# Patient Record
Sex: FEMALE | Race: WHITE
[De-identification: names, ages, dates, MRNs, and addresses within clinical notes are randomized per-mention and may not be internally consistent; named-entity substitution may affect disease eponyms.]

---

## 2017-10-31 ENCOUNTER — HOSPITAL ENCOUNTER (OUTPATIENT)
Dept: HOSPITAL 80 - FIMAGING | Age: 62
End: 2017-10-31
Attending: GENERAL ACUTE CARE HOSPITAL
Payer: COMMERCIAL

## 2017-10-31 DIAGNOSIS — Z12.31: Primary | ICD-10-CM

## 2017-10-31 PROCEDURE — G0202 SCR MAMMO BI INCL CAD: HCPCS

## 2018-05-10 ENCOUNTER — HOSPITAL ENCOUNTER (INPATIENT)
Dept: HOSPITAL 80 - FED | Age: 63
LOS: 8 days | Discharge: HOME | DRG: 330 | End: 2018-05-18
Attending: SURGERY | Admitting: SURGERY
Payer: COMMERCIAL

## 2018-05-10 DIAGNOSIS — K35.2: Primary | ICD-10-CM

## 2018-05-10 DIAGNOSIS — K91.89: ICD-10-CM

## 2018-05-10 LAB — PLATELET # BLD: 500 10^3/UL (ref 150–400)

## 2018-05-10 PROCEDURE — 0DTF0ZZ RESECTION OF RIGHT LARGE INTESTINE, OPEN APPROACH: ICD-10-PCS | Performed by: SURGERY

## 2018-05-10 RX ADMIN — HYDROMORPHONE HYDROCHLORIDE PRN MG: 2 INJECTION INTRAMUSCULAR; INTRAVENOUS; SUBCUTANEOUS at 21:16

## 2018-05-10 RX ADMIN — SODIUM CHLORIDE, SODIUM LACTATE, POTASSIUM CHLORIDE, AND CALCIUM CHLORIDE SCH MLS: 600; 310; 30; 20 INJECTION, SOLUTION INTRAVENOUS at 23:37

## 2018-05-10 RX ADMIN — Medication PRN MCG: at 21:48

## 2018-05-10 RX ADMIN — Medication PRN MCG: at 21:24

## 2018-05-10 RX ADMIN — HYDROMORPHONE HYDROCHLORIDE PRN MG: 2 INJECTION INTRAMUSCULAR; INTRAVENOUS; SUBCUTANEOUS at 21:37

## 2018-05-10 RX ADMIN — HYDROMORPHONE HYDROCHLORIDE PRN MG: 2 INJECTION INTRAMUSCULAR; INTRAVENOUS; SUBCUTANEOUS at 21:48

## 2018-05-10 RX ADMIN — HYDROMORPHONE HYDROCHLORIDE PRN MG: 2 INJECTION INTRAMUSCULAR; INTRAVENOUS; SUBCUTANEOUS at 21:57

## 2018-05-10 RX ADMIN — ONDANSETRON PRN MG: 2 SOLUTION INTRAMUSCULAR; INTRAVENOUS at 23:30

## 2018-05-10 RX ADMIN — Medication PRN MCG: at 21:37

## 2018-05-10 RX ADMIN — Medication PRN MCG: at 21:03

## 2018-05-10 RX ADMIN — HYDROMORPHONE HYDROCHLORIDE PRN MG: 2 INJECTION INTRAMUSCULAR; INTRAVENOUS; SUBCUTANEOUS at 21:24

## 2018-05-10 RX ADMIN — Medication PRN MCG: at 21:57

## 2018-05-10 RX ADMIN — Medication PRN MCG: at 21:13

## 2018-05-10 NOTE — POSTOPPROG
Post Op Note


Date of Operation: 05/10/18


Surgeon: Juan Toure


Assistant: coltraine


Pre-op Diagnosis: ruptured appendix


Post-op Diagnosis: mass in cecum


Indication: rlq mass


Procedure: right colecotmy


Findings: mass, possible cancer of cecum


Inf/Abcess present in the surg proc area at time of surgery?: No


EBL: Minimal

## 2018-05-10 NOTE — GOP
[f rep st]



                                                                OPERATIVE REPORT





DATE OF OPERATION:  



SURGEON:  Juan Toure MD



ASSISTANT:  Javi Luna, VICKEYA, LSA.



ANESTHESIA:  General.



PREOPERATIVE DIAGNOSIS:  Perforated appendicitis with abscess.



POSTOPERATIVE DIAGNOSIS:  Inflammatory mass cecum.  No abscess.



PROCEDURE PERFORMED:  



FINDINGS:  





INDICATIONS:  The patient is a 62-year-old female who presents with a 3-week history of right lower q
uadrant pain.  A CT scan preoperatively had suggested possible ruptured appendicitis with an abscess.




DESCRIPTION OF PROCEDURE:  The abdomen was scrubbed with ChloraPrep and draped in the usual sterile f
ashion.  Palpation revealed a firm 10 cm mass just medial to McBurney's point.  A midline incision wa
s made through the umbilicus.  Eventually this was lengthened further cephalad. Retraction was done w
ith an Denys large wound retractor.  The abdomen was opened and explored.  Small bowel was brought up
 out of the pelvis and packed cephalad.  The omentum was pushed cephalad.  Inflammatory mass involvin
g the cecum was noted.  The cecal tip was deviated medially, but the whole mass was fairly unapproach
able and welded in the retroperitoneum on the right. With the incision extended the right colon was m
obilized at its more normal midportion and the hepatic flexure developed easily.  The colon was broug
ht medially identifying the duodenum.  There was inflammation all the way up to the duodenum.  Carefu
l dissection allowed the colon to be brought completely medially at which point, the omentum was sepa
rated from the hepatic flexure.  It was felt that this could certainly be a perforated carcinoma of t
he cecum, so it was felt reasonable to do a right colectomy.  The mesocolon was opened near the hepat
ic flexure.  The right branch of the middle colic artery amputated with clamps and ties and then the 
mesentery harvested with clamps and ties.  This was difficult because of the very thick, indurated na
ture of the root of the mesentery.  After all pedicles were tied, the ileocolic vessel was approached
, clamped, and divided.  The terminal ileum was amputated for few centimeters  and the bowel transect
ed with a stapling device.  The transverse colon was also transected with a stapling device, and the 
specimen handed off the field.  A search of the specimen suggested there might be an appendiceal stum
p without the rest of the appendix, although the tissue was so necrotic, it was difficult to tell.  A
 search in the right lower quadrant showed some inflammatory tissues which could be peeled off the re
troperitoneum.  There was quite a bit of reaction, and it was really unclear what was being removed, 
but this was submitted as a separate specimen called additional right lower quadrant tissues.  It was
 felt it might have a portion of the distal appendix.  The abdomen was irrigated with several liters 
of saline.  Hemostasis was excellent and isoperistaltic anastomosis was done from the terminal ileum 
to the right transverse colon with a stapling device.  The enterotomy was closed with a running 3-0 V
icryl followed by interrupted 3-0 silk Lembert sutures.  The mesenteric defect was closed to prevent 
internal hernias.  With the anastomosis in the right upper quadrant all lap pads were removed.  Hemos
tasis was again evaluated and was excellent.  A clean closure was practiced with all new drapes, glov
es, gowns, instruments, etc.  The linea alba was closed with a #1 PDS from either end, tied in the mi
ddle.  Then several liters of warm saline were used to irrigate the subcutaneous fat.  Hemostasis ens
ured and the skin closed with stainless steel clips.  The patient tolerated the procedure well.



SURGEON:  Juan Toure MD.





Job #:  [id number]

## 2018-05-10 NOTE — PDANEPAE
ANE History of Present Illness





Probable perforated appy





ANE Past Medical History





- Pulmonary History


Hx Oxygen in Use at Home: No


Hx Sleep Apnea: Yes


Sleep Apnea Screening Result - Last Documented: Positive





- Endocrine History


Hx Diabetes: No





ANE Review of Systems


Review of Systems: 








ANE Patient History





- Allergies


Allergies/Adverse Reactions: 








codeine Allergy (Verified 05/10/18 13:48)


 








- Home Medications


Home medications: home medication list seen and reviewed


Home Medications: 








Ciprofloxacin [Cipro] 500 mg PO BID 05/10/18 [Last Taken 05/10/18]


Timolol 0.25% [TIMOPTIC 0.25% (*)] 1 drops EACHEYE DAILY 05/10/18 [Last Taken 05

/10/18]


metroNIDAZOLE [Flagyl 500 mg (*)] 500 mg PO BID 05/10/18 [Last Taken 05/10/18]








- NPO status


NPO Since - Liquids (Date): 05/10/18


NPO Since - Liquids (Time): 11:30


NPO Since - Solids (Date): 05/10/18


NPO Since - Solids (Time): 11:30





- Anes Hx


Anes Hx: no prior problems





- Smoking Hx


Smoking Status: Never smoked





ANE Labs/Vital Signs





- Labs


Result Diagrams: 


 05/10/18 14:00





 05/10/18 14:00





- Vital Signs


Blood Pressure: 149/92


Heart Rate: 68


Respiratory Rate: 14


O2 Sat (%): 95


Height: 170.18 cm


Weight: 89.358 kg





ANE Physical Exam





- Airway


Neck exam: FROM


Mallampati Score: Class 1


Mouth exam: normal dental/mouth exam





- Pulmonary


Pulmonary: no respiratory distress





- Cardiovascular


Cardiovascular: regular rate and rhythym





- ASA Status


ASA Status: II, E





ANE Anesthesia Plan


Anesthesia Plan: general endotracheal anesthesia

## 2018-05-10 NOTE — EDPHY
H & P


Stated Complaint: sent by MD for ruptured abbendix on CT


Time Seen by Provider: 05/10/18 13:58


HPI/ROS: 





CHIEF COMPLAINT:  Appendicitis





HISTORY OF PRESENT ILLNESS:  62-year-old female presents with appendicitis, 

diagnosed on CT scan.  3 week history of right lower quadrant pain.  She was 

seen by a physician and diagnosed with appendicitis.  Initially placed on Cipro 

and later Flagyl was added to the regimen.  She has had persistent right lower 

quadrant pain.  She saw Dr. Damon at [facility] this morning and had an 

outpatient CT scan that demonstrated ruptured appendicitis.  Tolerating oral 

fluids well.  No fever today.





REVIEW OF SYSTEMS:  complete 10 point ROS negative except at noted in the HPI








- Personal History


Current Tetanus/Diphtheria Vaccine: Unsure


Current Tetanus Diphtheria and Acellular Pertussis (TDAP): Unsure





- Medical/Surgical History


Hx Asthma: No


Hx Chronic Respiratory Disease: No


Hx Diabetes: No


Hx Cardiac Disease: No


Hx Renal Disease: No


Hx Cirrhosis: No


Hx Alcoholism: No


Hx HIV/AIDS: No


Hx Splenectomy or Spleen Trauma: No


Other PMH: breast reduction, 3 





- Social History


Smoking Status: Never smoked





- Physical Exam


Exam: 





General Appearance:  Alert, pleasant


Eyes:  Pupils equal and round, no conjunctival pallor 


ENT, Mouth:  Mucous membranes moist


Neck:  Normal inspection


Respiratory:  Lungs are clear to auscultation


Cardiovascular:  Regular rate and rhythm


Gastrointestinal:  Abdomen is soft, right lower quadrant tenderness


Neurological:  A&O, nonfocal, normal gait


Skin:  Warm and dry


Extremities:  Normal inspection


Psychiatric:  Mood and affect normal





Constitutional: 


 Initial Vital Signs











Temperature (C)  36.5 C   05/10/18 13:48


 


Heart Rate  84   05/10/18 13:48


 


Respiratory Rate  16   05/10/18 13:48


 


Blood Pressure  173/93 H  05/10/18 13:48


 


O2 Sat (%)  92   05/10/18 13:48








 











O2 Delivery Mode               Room Air














Allergies/Adverse Reactions: 


 





codeine Allergy (Verified 05/10/18 13:48)


 








Home Medications: 














 Medication  Instructions  Recorded


 


Ciprofloxacin [Cipro] 500 mg PO BID 05/10/18


 


Timolol 0.25% [TIMOPTIC 0.25% (*)] 1 drops EACHEYE DAILY 05/10/18


 


metroNIDAZOLE [Flagyl 500 mg (*)] 500 mg PO BID 05/10/18














Medical Decision Making


ED Course/Re-evaluation: 





This patient presents with ruptured appendicitis, diagnosed by CT scan earlier 

today.  Unfortunately, the patient forgot to bring the CD of the CT scan and 

ate 1.5 hrs ago.  Consulted Dr. Toure who saw the patient in the emergency 

department.  Levaquin and Flagyl IV given.  Plan for appendectomy.


Differential Diagnosis: 





Differential diagnosis includes though it is not limited to appendicitis, 

cholecystitis, diverticulitis, pyelonephritis, bowel perforation, small bowel 

obstruction.





- Data Points


Laboratory Results: 


 Laboratory Results





 05/10/18 14:00 





 05/10/18 14:00 





 











  05/10/18 05/10/18





  14:00 14:00


 


WBC    7.14 10^3/uL 10^3/uL





    (3.80-9.50) 


 


RBC    4.00 10^6/uL L 10^6/uL





    (4.18-5.33) 


 


Hgb    11.5 g/dL L g/dL





    (12.6-16.3) 


 


Hct    35.4 % L %





    (38.0-47.0) 


 


MCV    88.5 fL fL





    (81.5-99.8) 


 


MCH    28.8 pg pg





    (27.9-34.1) 


 


MCHC    32.5 g/dL g/dL





    (32.4-36.7) 


 


RDW    13.2 % %





    (11.5-15.2) 


 


Plt Count    500 10^3/uL H 10^3/uL





    (150-400) 


 


MPV    9.3 fL fL





    (8.7-11.7) 


 


Neut % (Auto)    60.0 % %





    (39.3-74.2) 


 


Lymph % (Auto)    27.6 % %





    (15.0-45.0) 


 


Mono % (Auto)    9.4 % %





    (4.5-13.0) 


 


Eos % (Auto)    1.5 % %





    (0.6-7.6) 


 


Baso % (Auto)    0.4 % %





    (0.3-1.7) 


 


Nucleat RBC Rel Count    0.0 % %





    (0.0-0.2) 


 


Absolute Neuts (auto)    4.28 10^3/uL 10^3/uL





    (1.70-6.50) 


 


Absolute Lymphs (auto)    1.97 10^3/uL 10^3/uL





    (1.00-3.00) 


 


Absolute Monos (auto)    0.67 10^3/uL 10^3/uL





    (0.30-0.80) 


 


Absolute Eos (auto)    0.11 10^3/uL 10^3/uL





    (0.03-0.40) 


 


Absolute Basos (auto)    0.03 10^3/uL 10^3/uL





    (0.02-0.10) 


 


Absolute Nucleated RBC    0.00 10^3/uL 10^3/uL





    (0-0.01) 


 


Immature Gran %    1.1 % %





    (0.0-1.1) 


 


Immature Gran #    0.08 10^3/uL 10^3/uL





    (0.00-0.10) 


 


Sodium  144 mEq/L mEq/L  





   (135-145)  


 


Potassium  4.2 mEq/L mEq/L  





   (3.5-5.2)  


 


Chloride  103 mEq/L mEq/L  





   ()  


 


Carbon Dioxide  27 mEq/l mEq/l  





   (22-31)  


 


Anion Gap  14 mEq/L mEq/L  





   (8-16)  


 


BUN  10 mg/dL mg/dL  





   (7-23)  


 


Creatinine  0.7 mg/dL mg/dL  





   (0.6-1.0)  


 


Estimated GFR  > 60   





   


 


Glucose  110 mg/dL H mg/dL  





   ()  


 


Calcium  9.0 mg/dL mg/dL  





   (8.5-10.4)  











Medications Given: 


 








Discontinued Medications





Bupivacaine HCl (Sensorcaine 0.5% Vial) Confirm Administered Dose 30 ml .ROUTE 

.STK-MED ONE


   Stop: 05/10/18 15:49


   Last Admin: 05/10/18 19:24 Dose:  Not Given


Fentanyl (Sublimaze)  25 - 100 mcg IVP Q5M PRN


   PRN Reason: PACU, IMMEDIATE Pain control


   Stop: 05/10/18 20:13


   Last Admin: 05/10/18 21:03 Dose:  50 mcg


Levofloxacin/Dextrose (Levaquin 750 Mg (Premix))  150 mls @ 100 mls/hr IV EDNOW 

ONE


   PRN Reason: Protocol


   Stop: 05/10/18 17:15


   Last Admin: 05/10/18 16:07 Dose:  150 mls


Metronidazole/Sodium Chloride (Flagyl 500 Mg (Premix))  100 mls @ 100 mls/hr IV 

EDNOW ONE


   PRN Reason: Protocol


   Stop: 05/10/18 16:45


   Last Admin: 05/10/18 17:48 Dose:  100 mls


Midazolam HCl (Versed)  2 mg IVP ONCALL ONE


   Stop: 05/10/18 18:28


   Last Admin: 05/10/18 18:39 Dose:  2 mg








Departure





- Departure


Disposition: [facility]


Clinical Impression: 


Acute appendicitis


Qualifiers:


 Acute appendicitis type: with generalized peritonitis Qualified Code(s): K35.2 

- Acute appendicitis with generalized peritonitis





Condition: Fair

## 2018-05-10 NOTE — PDGENHP
History and Physical





- Chief Complaint


RLQ PAIN. 3 WEEKS OF RLQ PAIN, PREVIOUSLY VOMITING, tx'd witha week of orax





- History of Present Illness








History Information





- Allergies/Home Medication List


Allergies/Adverse Reactions: 








codeine Allergy (Verified 05/10/18 13:48)


 





Home Medications: 








Ciprofloxacin [Cipro] 500 mg PO BID 05/10/18 [Last Taken 05/10/18]


Timolol 0.25% [TIMOPTIC 0.25% (*)] 1 drops EACHEYE DAILY 05/10/18 [Last Taken 05

/10/18]


metroNIDAZOLE [Flagyl 500 mg (*)] 500 mg PO BID 05/10/18 [Last Taken 05/10/18]





I have personally reviewed and updated: family history





- Social History


Smoking Status: Never smoked





Review of Systems


Review of Systems: 





ROS: 10pt was reviewed & negative except for what was stated in HPI & below





Physical Exam


Physical Exam: 

















Temp Pulse Resp BP Pulse Ox


 


 36.4 C   70   18   140/82 H  94 


 


 05/10/18 15:00  05/10/18 15:00  05/10/18 15:00  05/10/18 15:00  05/10/18 15:00











Constitutional: no apparent distress


Eyes: PERRL


Cardiovascular: regular rate and rhythym


Respiratory: clear to auscultation


Gastrointestinal: other (tender rlq to midline, otherwis e soft)


Genitourinary: no bladder fullness


Skin: normal color





Lab Data & Imaging Review





 05/10/18 14:00





 05/10/18 14:00














WBC  7.14 10^3/uL (3.80-9.50)   05/10/18  14:00    


 


RBC  4.00 10^6/uL (4.18-5.33)  L  05/10/18  14:00    


 


Hgb  11.5 g/dL (12.6-16.3)  L  05/10/18  14:00    


 


Hct  35.4 % (38.0-47.0)  L  05/10/18  14:00    


 


MCV  88.5 fL (81.5-99.8)   05/10/18  14:00    


 


MCH  28.8 pg (27.9-34.1)   05/10/18  14:00    


 


MCHC  32.5 g/dL (32.4-36.7)   05/10/18  14:00    


 


RDW  13.2 % (11.5-15.2)   05/10/18  14:00    


 


Plt Count  500 10^3/uL (150-400)  H  05/10/18  14:00    


 


MPV  9.3 fL (8.7-11.7)   05/10/18  14:00    


 


Neut % (Auto)  60.0 % (39.3-74.2)   05/10/18  14:00    


 


Lymph % (Auto)  27.6 % (15.0-45.0)   05/10/18  14:00    


 


Mono % (Auto)  9.4 % (4.5-13.0)   05/10/18  14:00    


 


Eos % (Auto)  1.5 % (0.6-7.6)   05/10/18  14:00    


 


Baso % (Auto)  0.4 % (0.3-1.7)   05/10/18  14:00    


 


Nucleat RBC Rel Count  0.0 % (0.0-0.2)   05/10/18  14:00    


 


Absolute Neuts (auto)  4.28 10^3/uL (1.70-6.50)   05/10/18  14:00    


 


Absolute Lymphs (auto)  1.97 10^3/uL (1.00-3.00)   05/10/18  14:00    


 


Absolute Monos (auto)  0.67 10^3/uL (0.30-0.80)   05/10/18  14:00    


 


Absolute Eos (auto)  0.11 10^3/uL (0.03-0.40)   05/10/18  14:00    


 


Absolute Basos (auto)  0.03 10^3/uL (0.02-0.10)   05/10/18  14:00    


 


Absolute Nucleated RBC  0.00 10^3/uL (0-0.01)   05/10/18  14:00    


 


Immature Gran %  1.1 % (0.0-1.1)   05/10/18  14:00    


 


Immature Gran #  0.08 10^3/uL (0.00-0.10)   05/10/18  14:00    


 


Sodium  144 mEq/L (135-145)   05/10/18  14:00    


 


Potassium  4.2 mEq/L (3.5-5.2)   05/10/18  14:00    


 


Chloride  103 mEq/L ()   05/10/18  14:00    


 


Carbon Dioxide  27 mEq/l (22-31)   05/10/18  14:00    


 


Anion Gap  14 mEq/L (8-16)   05/10/18  14:00    


 


BUN  10 mg/dL (7-23)   05/10/18  14:00    


 


Creatinine  0.7 mg/dL (0.6-1.0)   05/10/18  14:00    


 


Estimated GFR  > 60   05/10/18  14:00    


 


Glucose  110 mg/dL ()  H  05/10/18  14:00    


 


Calcium  9.0 mg/dL (8.5-10.4)   05/10/18  14:00    











Assessment & Plan


Assessment: 








Acute appendicitis (Acute)





prob ruptured appendix





plan ex lap.


for abscess and appendicits.


risks of infection, bleeding, etc

## 2018-05-11 RX ADMIN — HEPARIN SODIUM SCH UNIT: 5000 INJECTION, SOLUTION INTRAVENOUS; SUBCUTANEOUS at 15:04

## 2018-05-11 RX ADMIN — Medication SCH: at 08:23

## 2018-05-11 RX ADMIN — Medication SCH MLS: at 20:24

## 2018-05-11 RX ADMIN — SODIUM CHLORIDE, SODIUM LACTATE, POTASSIUM CHLORIDE, AND CALCIUM CHLORIDE SCH MLS: 600; 310; 30; 20 INJECTION, SOLUTION INTRAVENOUS at 17:31

## 2018-05-11 RX ADMIN — HEPARIN SODIUM SCH UNIT: 5000 INJECTION, SOLUTION INTRAVENOUS; SUBCUTANEOUS at 20:51

## 2018-05-11 RX ADMIN — SODIUM CHLORIDE, SODIUM LACTATE, POTASSIUM CHLORIDE, AND CALCIUM CHLORIDE SCH MLS: 600; 310; 30; 20 INJECTION, SOLUTION INTRAVENOUS at 08:53

## 2018-05-11 RX ADMIN — HEPARIN SODIUM SCH UNIT: 5000 INJECTION, SOLUTION INTRAVENOUS; SUBCUTANEOUS at 05:50

## 2018-05-11 RX ADMIN — TIMOLOL MALEATE SCH DROP: 5 SOLUTION OPHTHALMIC at 12:14

## 2018-05-11 RX ADMIN — Medication SCH MLS: at 12:56

## 2018-05-11 RX ADMIN — ACETAMINOPHEN PRN MG: 325 TABLET ORAL at 17:12

## 2018-05-11 NOTE — SOAPPROG
SOAP Progress Note


Assessment/Plan: 


Assessment:


























Plan:





Subjective: 





PAIN BETTER WITH EPIDURAL





vss, af


s/p right colectomy for mass- ddx is cancer versus inflammatory process.





cont clear liq until passing flatus or stool


Objective: 





 Vital Signs











Temp Pulse Resp BP Pulse Ox


 


 36.6 C   61   18   127/75 H  97 


 


 05/11/18 02:53  05/11/18 04:11  05/11/18 02:53  05/11/18 04:11  05/11/18 04:11








 











 05/10/18 05/11/18 05/12/18





 05:59 05:59 05:59


 


Intake Total  1620 


 


Output Total  145 


 


Balance  1475 














ICD10 Worksheet


Patient Problems: 


 Problems











Problem Status Onset


 


Acute appendicitis Acute

## 2018-05-11 NOTE — POSTANESTH
Post Anesthetic Evaluation


Cardiovascular Status: Normal, Stable, Similar to Pre-Op Cond


Respiratory Status: Normal, Stable, Similar to Pre-op Cond.


Level of Consciousness/Mental Status: Can Participate in Eval, Alert and 

Oriented


Pain Control: Adequate, Prn Tx Ordered


Nausea/Vomiting Control: Adequate, Prn Tx Ordered


Complications Possibly Related to Anesthesia: None Noted


Notes: 


Pt seen and examined, POD1 s/p ex lap w colectomy, postop thoracic epidural 

placed, T7-8, LEONA 6cm catheter 11 cm.  Back site c/d/i, no e/e/e.  Pain control 

much improved.  Dermatome level ~T5-L1 B symmetric.  Notes some itchiness but 

rates it as tolerable.  No n/v.


Plan:


:Continue PCEA rate 6 cc/hr, bolus 3cc q 15min.


:May ambulate with assistance.


:Plan to maintain epidural for next days, will coordinate with surgical team as 

far as disposition plan.  Will request to please hold heparin dose 6 hours 

prior to planned epidural d/c.

## 2018-05-12 RX ADMIN — HEPARIN SODIUM SCH UNIT: 5000 INJECTION, SOLUTION INTRAVENOUS; SUBCUTANEOUS at 20:15

## 2018-05-12 RX ADMIN — HEPARIN SODIUM SCH UNIT: 5000 INJECTION, SOLUTION INTRAVENOUS; SUBCUTANEOUS at 05:38

## 2018-05-12 RX ADMIN — Medication SCH MLS: at 16:54

## 2018-05-12 RX ADMIN — ACETAMINOPHEN PRN MG: 325 TABLET ORAL at 20:16

## 2018-05-12 RX ADMIN — Medication SCH EA: at 08:05

## 2018-05-12 RX ADMIN — PROMETHAZINE HYDROCHLORIDE PRN MG: 25 INJECTION INTRAMUSCULAR; INTRAVENOUS at 02:06

## 2018-05-12 RX ADMIN — SODIUM CHLORIDE, SODIUM LACTATE, POTASSIUM CHLORIDE, AND CALCIUM CHLORIDE SCH MLS: 600; 310; 30; 20 INJECTION, SOLUTION INTRAVENOUS at 05:38

## 2018-05-12 RX ADMIN — SODIUM CHLORIDE, SODIUM LACTATE, POTASSIUM CHLORIDE, AND CALCIUM CHLORIDE SCH MLS: 600; 310; 30; 20 INJECTION, SOLUTION INTRAVENOUS at 16:58

## 2018-05-12 RX ADMIN — Medication SCH MLS: at 08:05

## 2018-05-12 RX ADMIN — TIMOLOL MALEATE SCH DROP: 5 SOLUTION OPHTHALMIC at 08:02

## 2018-05-12 RX ADMIN — ACETAMINOPHEN PRN MG: 325 TABLET ORAL at 08:00

## 2018-05-12 RX ADMIN — HEPARIN SODIUM SCH UNIT: 5000 INJECTION, SOLUTION INTRAVENOUS; SUBCUTANEOUS at 15:32

## 2018-05-12 NOTE — SOAPPROG
SOAP Progress Note


Assessment/Plan: 


Assessment:


























Plan:





05/12/18 09:35


VSS, BUT TEMP 39





LUNGS CLEAR


ABD SOFT 


LEGS WITHOUT PAIN OR SELLLING.





PAZ IN








LOOKS BETTER THAN FEVER WOULD SUGGEST=- WILL CHECK CXR AND URINE CULTURE


ON ANCEF AND FLAGYL





MAY BE ALL FROM ATELECTASIS





DOING VERY WELL OTHEREWISE.


Objective: 





 Vital Signs











Temp Pulse Resp BP Pulse Ox


 


 39.3 C H  90   17   148/81 H  94 


 


 05/12/18 07:53  05/12/18 07:53  05/12/18 07:53  05/12/18 07:53  05/12/18 07:53








 











 05/11/18 05/12/18 05/13/18





 05:59 05:59 05:59


 


Intake Total 1620 3238 


 


Output Total 145 1850 


 


Balance 1475 1388 














ICD10 Worksheet


Patient Problems: 


 Problems











Problem Status Onset


 


Acute appendicitis Acute

## 2018-05-12 NOTE — ASMTCMCOM
CM Note

 

CM Note                       

Notes:

Pt had rt colectomy due to mass, which is inflammatory vs malignant. Results pending. DC needs 

unclear but too early to tell. Pt may be ready to DC Mon or Tue. CM to follow.

 

Date Signed:  05/12/2018 05:40 PM

Electronically Signed By:Devi Peña LCSW

## 2018-05-12 NOTE — POSTANESTH
Post Anesthetic Evaluation


Cardiovascular Status: Normal, Stable, Similar to Pre-Op Cond, Tx Over/Under 

Hydration


Respiratory Status: Normal, Stable


Level of Consciousness/Mental Status: Can Participate in Eval, Alert and 

Oriented


Pain Control: Inadeq, Add Tx Required


Nausea/Vomiting Control: Adequate, Prn Tx Ordered


Complications Possibly Related to Anesthesia: None Noted


Notes: 


S/P ex lap w/ colectomy, POD2, post-op epidural placed.  Pt has noted an 

interval increase in pain.  Has been pressing her bolus button often with some 

increased relief.  Back site c/d/i, no e/e/e, catheter has migrated out to ~9.5 

cm at the skin from 11 cm yesterday.  Dermatome is T4-L2 on the R and T5-T10 on 

the L.  Reports one episode of nausea, mild itchiness (tolerable).





Plan:


:Epidural catheter still in the epidural space AEB +dermatomes, though the 

outward migration of the catheter (likely from incidental pt movement) explains 

the newly one-sided quality.  Will not manipulate the catheter at this time, 

but rather increase rate to 8cc/hr Bupi 0.1% fent 2 mcg/mL with bolus 4cc q 15 

min; attempt to more effectively fill the space and cover the right side better.


:Continue multi- modal pain control with tylenol, toradol, consider neurontin 

for any neuropathic pain.


:will consider to wean from epidural and transition to PO pain meds tomorrow or 

Monday.


:will request to hold heparin dose at that time, then resume 2 hours later.

## 2018-05-13 RX ADMIN — ONDANSETRON PRN MG: 2 SOLUTION INTRAMUSCULAR; INTRAVENOUS at 04:23

## 2018-05-13 RX ADMIN — ONDANSETRON PRN MG: 2 SOLUTION INTRAMUSCULAR; INTRAVENOUS at 10:54

## 2018-05-13 RX ADMIN — TIMOLOL MALEATE SCH DROP: 5 SOLUTION OPHTHALMIC at 09:46

## 2018-05-13 RX ADMIN — HEPARIN SODIUM SCH UNIT: 5000 INJECTION, SOLUTION INTRAVENOUS; SUBCUTANEOUS at 22:14

## 2018-05-13 RX ADMIN — Medication SCH MLS: at 01:10

## 2018-05-13 RX ADMIN — HEPARIN SODIUM SCH UNIT: 5000 INJECTION, SOLUTION INTRAVENOUS; SUBCUTANEOUS at 15:18

## 2018-05-13 RX ADMIN — SODIUM CHLORIDE, SODIUM LACTATE, POTASSIUM CHLORIDE, AND CALCIUM CHLORIDE SCH MLS: 600; 310; 30; 20 INJECTION, SOLUTION INTRAVENOUS at 01:14

## 2018-05-13 RX ADMIN — SODIUM CHLORIDE, SODIUM LACTATE, POTASSIUM CHLORIDE, AND CALCIUM CHLORIDE SCH MLS: 600; 310; 30; 20 INJECTION, SOLUTION INTRAVENOUS at 15:28

## 2018-05-13 RX ADMIN — Medication SCH EA: at 09:48

## 2018-05-13 RX ADMIN — HEPARIN SODIUM SCH UNIT: 5000 INJECTION, SOLUTION INTRAVENOUS; SUBCUTANEOUS at 05:33

## 2018-05-13 RX ADMIN — PROMETHAZINE HYDROCHLORIDE PRN MG: 25 INJECTION INTRAMUSCULAR; INTRAVENOUS at 02:29

## 2018-05-13 RX ADMIN — Medication SCH MLS: at 13:05

## 2018-05-13 RX ADMIN — PROMETHAZINE HYDROCHLORIDE PRN MG: 25 INJECTION INTRAMUSCULAR; INTRAVENOUS at 07:51

## 2018-05-13 RX ADMIN — Medication SCH MLS: at 22:33

## 2018-05-13 NOTE — POSTANESTH
Post Anesthetic Evaluation


Cardiovascular Status: Normal, Stable, Similar to Pre-Op Cond


Respiratory Status: Normal, Stable, Similar to Pre-op Cond.


Level of Consciousness/Mental Status: Can Participate in Eval, Alert and 

Oriented


Pain Control: Inadeq, Add Tx Required


Nausea/Vomiting Control: Inadeq, Add Tx Reqired


Complications Possibly Related to Anesthesia: None Noted


Notes: 


POD#3 s/p ex lap/colectomy w/ PCEA.  Pt rates pain control as good, 4-5/10, 

mainly associated with movement.  Has had recent nausea and vomiting, 

temporally associated with PO liquid intake.  Back site c/d/i, no e/e/e, 

catheter stable at 9.5 cm at the skin.  Dermatone coverage unchanged from 

previous exam, R>L T4-L2


Plan:


:Unable to tolerate PO at this time so transition to PO pain meds untenable.


:Maintain PCEA bupi 0.1%/fent 2mcg/mL 8cc/hr bolus 4 cc q 15.


:May ambulate with assistance.


:Will revisit disposition of catheter tomorrow.

## 2018-05-13 NOTE — SOAPPROG
SOAP Progress Note


Assessment/Plan: 


Assessment:


























Plan:





05/12/18 09:35


VSS, BUT TEMP 39





LUNGS CLEAR


ABD SOFT 


LEGS WITHOUT PAIN OR SELLLING.





PAZ IN








LOOKS BETTER THAN FEVER WOULD SUGGEST=- WILL CHECK CXR AND URINE CULTURE


ON ANCEF AND FLAGYL





MAY BE ALL FROM ATELECTASIS





DOING VERY WELL OTHEREWISE.


Subjective: 





large emesis, no flatus.





lungs clear- cxr yesterday pretty ormal.


abd soft





access: normal wbc, but with persistant temps.


emesis troubling- will continue to monitor- may need repea ct, although 

unlikely anything acute.





slower than expected course of improvement.


Objective: 





 Vital Signs











Temp Pulse Resp BP Pulse Ox


 


 37.7 C   88   16   137/80 H  95 


 


 05/13/18 04:00  05/13/18 04:00  05/13/18 04:00  05/13/18 04:00  05/13/18 04:00








 











 05/12/18 05/13/18 05/14/18





 05:59 05:59 05:59


 


Intake Total 0058 4013 


 


Output Total 9590 1500 


 


Balance 1388 2513 














ICD10 Worksheet


Patient Problems: 


 Problems











Problem Status Onset


 


Acute appendicitis Acute

## 2018-05-13 NOTE — SOAPPROG
SOAP Progress Note


Assessment/Plan: 


Assessment:


























Plan:





05/12/18 09:35


VSS, BUT TEMP 39





LUNGS CLEAR


ABD SOFT 


LEGS WITHOUT PAIN OR SELLLING.





PAZ IN








LOOKS BETTER THAN FEVER WOULD SUGGEST=- WILL CHECK CXR AND URINE CULTURE


ON ANCEF AND FLAGYL





MAY BE ALL FROM ATELECTASIS





DOING VERY WELL OTHEREWISE.


Objective: 


ct done after large emesis- large stomach, dilated small bowel, but patent 

anastamosis- apparnet ileus. ng placed will wait for resolution prior to 

removing ng


'


willaslo start ng replacemnt orders.


 Vital Signs











Temp Pulse Resp BP Pulse Ox


 


 37.0 C   81   18   144/91 H  95 


 


 05/13/18 15:08  05/13/18 15:08  05/13/18 15:08  05/13/18 15:08  05/13/18 15:08








 Laboratory Results





 05/13/18 08:55 





 











 05/12/18 05/13/18 05/14/18





 05:59 05:59 05:59


 


Intake Total 3238 4013 


 


Output Total 1850 1500 2000


 


Balance 1388 2513 -2000














ICD10 Worksheet


Patient Problems: 


 Problems











Problem Status Onset


 


Acute appendicitis Acute

## 2018-05-14 RX ADMIN — DEXTROSE MONOHYDRATE, SODIUM CHLORIDE, AND POTASSIUM CHLORIDE SCH MLS: 50; 9; 1.49 INJECTION, SOLUTION INTRAVENOUS at 07:34

## 2018-05-14 RX ADMIN — Medication PRN MG: at 09:56

## 2018-05-14 RX ADMIN — Medication PRN MG: at 12:11

## 2018-05-14 RX ADMIN — HEPARIN SODIUM SCH UNIT: 5000 INJECTION, SOLUTION INTRAVENOUS; SUBCUTANEOUS at 21:49

## 2018-05-14 RX ADMIN — DEXTROSE MONOHYDRATE, SODIUM CHLORIDE, AND POTASSIUM CHLORIDE SCH MLS: 50; 9; 1.49 INJECTION, SOLUTION INTRAVENOUS at 17:10

## 2018-05-14 RX ADMIN — TIMOLOL MALEATE SCH DROP: 5 SOLUTION OPHTHALMIC at 07:45

## 2018-05-14 RX ADMIN — Medication SCH: at 10:30

## 2018-05-14 RX ADMIN — HEPARIN SODIUM SCH UNIT: 5000 INJECTION, SOLUTION INTRAVENOUS; SUBCUTANEOUS at 15:24

## 2018-05-14 RX ADMIN — Medication PRN MG: at 18:28

## 2018-05-14 RX ADMIN — Medication PRN MG: at 20:35

## 2018-05-14 RX ADMIN — HEPARIN SODIUM SCH UNIT: 5000 INJECTION, SOLUTION INTRAVENOUS; SUBCUTANEOUS at 05:56

## 2018-05-14 RX ADMIN — Medication PRN MG: at 22:41

## 2018-05-14 RX ADMIN — Medication PRN MG: at 15:21

## 2018-05-14 NOTE — SOAPPROG
SOAP Progress Note


Assessment/Plan: 


Assessment:


























Plan:





05/12/18 09:35


VSS, BUT TEMP 39





LUNGS CLEAR


ABD SOFT 


LEGS WITHOUT PAIN OR SELLLING.





PAZ IN








LOOKS BETTER THAN FEVER WOULD SUGGEST=- WILL CHECK CXR AND URINE CULTURE


ON ANCEF AND FLAGYL





MAY BE ALL FROM ATELECTASIS





DOING VERY WELL OTHEREWISE.


Subjective: 





vss, fever seems gone.


abd mildly distended.


after initial 1100 mls out ng tube, very little over night.





no flatus yet,


lyes show elevated bicarb, k of 3.4. nees some saline for contraction 

alakalosis.





access: slow recovery of ileus after r colectomy for missed perorated appendix 

versus tumor- pod 4. hopefully decreased ng output indicative of impending 

resolution.


will give some additional saline and kcl.


Objective: 





 Vital Signs











Temp Pulse Resp BP Pulse Ox


 


 37.7 C   78   12   139/74 H  96 


 


 05/14/18 03:45  05/14/18 03:45  05/14/18 03:45  05/14/18 03:45  05/14/18 03:45








 Laboratory Results





 05/14/18 03:54 





 











 05/13/18 05/14/18 05/15/18





 05:59 05:59 05:59


 


Intake Total 4013 1650 1527


 


Output Total 1500 3050 100


 


Balance 2513 -1400 1427














ICD10 Worksheet


Patient Problems: 


 Problems











Problem Status Onset


 


Acute appendicitis Acute

## 2018-05-14 NOTE — ASMTCMCOM
CM Note

 

CM Note                       

Notes:

Chart reviewed. Likely ileus. NPO at present. Per therapies likely to be independent, plan still 

unclear. CM to follow,



Plan: Likely home when medically cleared

 

Date Signed:  05/14/2018 04:18 PM

Electronically Signed By:Antoinette Wakefield RN

## 2018-05-14 NOTE — POSTANESTH
Post Anesthetic Evaluation


Cardiovascular Status: Normal, Stable, Similar to Pre-Op Cond


Respiratory Status: Normal, Stable, Similar to Pre-op Cond.


Level of Consciousness/Mental Status: Can Participate in Eval, Alert and 

Oriented


Pain Control: Adequate, Prn Tx Ordered


Nausea/Vomiting Control: Adequate, Prn Tx Ordered


Complications Possibly Related to Anesthesia: None Noted


Notes: 


POD4 s/p Partial colectomy, PCEA.  Pt rates pain control as good.  N/V have 

resolved, but ileus continues, NGT in place, no gas.  Pt would like to d/c 

epidural in order to bathe.  Back site c/d/i, no e/e/e.





Plan:Hold PCEA infusion now.


:Pull epidural at 1200 noon (6 hrs after heparin).  Resume heparin 2 hrs later.


:Ordered Dilaudid IVP PRN 0.2 q 2h and 0.4 q4h (severe pain, anticipated 

movement).


:further pain management per surgical team. None

## 2018-05-15 RX ADMIN — Medication PRN MG: at 12:59

## 2018-05-15 RX ADMIN — HEPARIN SODIUM SCH UNIT: 5000 INJECTION, SOLUTION INTRAVENOUS; SUBCUTANEOUS at 16:30

## 2018-05-15 RX ADMIN — Medication PRN MG: at 20:45

## 2018-05-15 RX ADMIN — TIMOLOL MALEATE SCH DROP: 5 SOLUTION OPHTHALMIC at 09:38

## 2018-05-15 RX ADMIN — Medication PRN MG: at 16:30

## 2018-05-15 RX ADMIN — Medication PRN MG: at 09:37

## 2018-05-15 RX ADMIN — Medication PRN MG: at 04:35

## 2018-05-15 RX ADMIN — DEXTROSE MONOHYDRATE, SODIUM CHLORIDE, AND POTASSIUM CHLORIDE SCH MLS: 50; 9; 1.49 INJECTION, SOLUTION INTRAVENOUS at 04:32

## 2018-05-15 RX ADMIN — HEPARIN SODIUM SCH UNIT: 5000 INJECTION, SOLUTION INTRAVENOUS; SUBCUTANEOUS at 21:02

## 2018-05-15 RX ADMIN — Medication PRN MG: at 00:34

## 2018-05-15 RX ADMIN — HEPARIN SODIUM SCH UNIT: 5000 INJECTION, SOLUTION INTRAVENOUS; SUBCUTANEOUS at 06:31

## 2018-05-15 RX ADMIN — Medication SCH: at 09:29

## 2018-05-15 RX ADMIN — Medication PRN MG: at 02:39

## 2018-05-15 RX ADMIN — Medication PRN MG: at 06:34

## 2018-05-15 NOTE — SOAPPROG
SOAP Progress Note


Assessment/Plan: 


Assessment:


























Plan:





05/12/18 09:35


VSS, BUT TEMP 39





LUNGS CLEAR


ABD SOFT 


LEGS WITHOUT PAIN OR SELLLING.





AHUMADA IN








LOOKS BETTER THAN FEVER WOULD SUGGEST=- WILL CHECK CXR AND URINE CULTURE


ON ANCEF AND FLAGYL





MAY BE ALL FROM ATELECTASIS





DOING VERY WELL OTHEREWISE.


Subjective: 





vss, af


epidural out, ahumada out. 


abd soft, no flatus or stool yet. ng output down.


today is day 5 post op- hopefully resolves ileus today!


Objective: 





 Vital Signs











Temp Pulse Resp BP Pulse Ox


 


 36.4 C   78   18   145/84 H  97 


 


 05/15/18 07:47  05/15/18 07:47  05/15/18 07:47  05/15/18 07:47  05/15/18 07:47








 Microbiology











 05/12/18 12:15 Urine Culture - Final





 Urine,Clean Catch 








 Laboratory Results





 05/15/18 04:14 





 











 05/14/18 05/15/18 05/16/18





 05:59 05:59 05:59


 


Intake Total 1650 3032 1466


 


Output Total 3050 750 


 


Balance -1400 2282 1466














ICD10 Worksheet


Patient Problems: 


 Problems











Problem Status Onset


 


Acute appendicitis Acute

## 2018-05-16 RX ADMIN — Medication PRN MG: at 08:33

## 2018-05-16 RX ADMIN — Medication PRN MG: at 02:27

## 2018-05-16 RX ADMIN — ACETAMINOPHEN PRN MG: 325 TABLET ORAL at 04:02

## 2018-05-16 RX ADMIN — HYDROCODONE BITARTRATE AND ACETAMINOPHEN PRN TAB: 5; 325 TABLET ORAL at 21:44

## 2018-05-16 RX ADMIN — HEPARIN SODIUM SCH UNIT: 5000 INJECTION, SOLUTION INTRAVENOUS; SUBCUTANEOUS at 05:15

## 2018-05-16 RX ADMIN — ACETAMINOPHEN PRN MG: 325 TABLET ORAL at 15:27

## 2018-05-16 RX ADMIN — HEPARIN SODIUM SCH UNIT: 5000 INJECTION, SOLUTION INTRAVENOUS; SUBCUTANEOUS at 21:42

## 2018-05-16 RX ADMIN — HEPARIN SODIUM SCH UNIT: 5000 INJECTION, SOLUTION INTRAVENOUS; SUBCUTANEOUS at 14:31

## 2018-05-16 RX ADMIN — TIMOLOL MALEATE SCH DROP: 5 SOLUTION OPHTHALMIC at 08:34

## 2018-05-16 NOTE — ASMTCMCOM
CM Note

 

CM Note                       

Notes:

Pt's path back and she does not have a malignancy. PT/OT evals recommend SNF vs HC. Pt's insurance 

does not offer HC or SNF benefits. Waiting for pt's ileus to resolve. She may not need services at 

time of DC or may be willing to pay out of pocket. Per [employer], pt does not qualify for medicaid.

 

Date Signed:  05/16/2018 10:09 AM

Electronically Signed By:Devi Peña LCSW

## 2018-05-16 NOTE — SOAPPROG
SOAP Progress Note


Assessment/Plan: 


Assessment:


























Plan:





05/12/18 09:35


VSS, BUT TEMP 39





LUNGS CLEAR


ABD SOFT 


LEGS WITHOUT PAIN OR SELLLING.





PAZ IN








LOOKS BETTER THAN FEVER WOULD SUGGEST=- WILL CHECK CXR AND URINE CULTURE


ON ANCEF AND FLAGYL





MAY BE ALL FROM ATELECTASIS





DOING VERY WELL OTHEREWISE.


Subjective: 





no flatus or stool, no n or v


Objective: 





 Vital Signs











Temp Pulse Resp BP Pulse Ox


 


 36.8 C   70   17   147/88 H  99 


 


 05/16/18 07:20  05/16/18 07:20  05/16/18 07:20  05/16/18 07:20  05/16/18 07:20








 Laboratory Results





 05/16/18 04:48 





 











 05/15/18 05/16/18 05/17/18





 05:59 05:59 05:59


 


Intake Total 3032 2523 


 


Output Total 750 360 


 


Balance 2282 2163 








ng output only 200 pas t 24 hours- will clamp ng 4 hors and check residual. 

hopefully resolving ileus. not all that distednded.





ICD10 Worksheet


Patient Problems: 


 Problems











Problem Status Onset


 


Acute appendicitis Acute

## 2018-05-17 RX ADMIN — HYDROCODONE BITARTRATE AND ACETAMINOPHEN PRN TAB: 5; 325 TABLET ORAL at 04:57

## 2018-05-17 RX ADMIN — HEPARIN SODIUM SCH UNIT: 5000 INJECTION, SOLUTION INTRAVENOUS; SUBCUTANEOUS at 14:53

## 2018-05-17 RX ADMIN — ACETAMINOPHEN PRN MG: 325 TABLET ORAL at 20:40

## 2018-05-17 RX ADMIN — ACETAMINOPHEN PRN MG: 325 TABLET ORAL at 14:57

## 2018-05-17 RX ADMIN — HEPARIN SODIUM SCH UNIT: 5000 INJECTION, SOLUTION INTRAVENOUS; SUBCUTANEOUS at 04:57

## 2018-05-17 RX ADMIN — HEPARIN SODIUM SCH UNIT: 5000 INJECTION, SOLUTION INTRAVENOUS; SUBCUTANEOUS at 20:39

## 2018-05-17 RX ADMIN — TIMOLOL MALEATE SCH DROP: 5 SOLUTION OPHTHALMIC at 09:33

## 2018-05-17 NOTE — ASMTCMCOM
CM Note

 

CM Note                       

Notes:

Met with pt to discuss DC needs. Pt has an insurance that does not cover any outpt services. Pt 

recs HC but it would cost pt $100/hr and she cannot afford it. She states that her fiance is able 

to assist her at home, she is getting a walker and shower chair and PT is showing her some home 

exercises. She also needs home O2 and feels that she can only afford one or the other. Pt will 

likely DC tomorrow.

 

Date Signed:  05/17/2018 03:38 PM

Electronically Signed By:Devi Peña LCSW

## 2018-05-17 NOTE — SOAPPROG
SOAP Progress Note


Assessment/Plan: 


Assessment:


























Plan:


dc home if does ok with full liq diet. follow up mon for staple removal.


Subjective: 





no n or v


tolerating liquid diet.


Objective: 





 Vital Signs











Temp Pulse Resp BP Pulse Ox


 


 37.2 C   82   16   118/92 H  95 


 


 05/17/18 04:00  05/17/18 04:00  05/17/18 04:00  05/17/18 04:00  05/17/18 04:00








 Laboratory Results





 05/17/18 04:44 





 











 05/16/18 05/17/18 05/18/18





 05:59 05:59 05:59


 


Intake Total 2523 500 


 


Output Total 360 5 


 


Balance 2163 495 








abd soft, non tender. wound with no sign of infection





ICD10 Worksheet


Patient Problems: 


 Problems











Problem Status Onset


 


Acute appendicitis Acute

## 2018-05-18 VITALS — DIASTOLIC BLOOD PRESSURE: 89 MMHG | SYSTOLIC BLOOD PRESSURE: 156 MMHG

## 2018-05-18 RX ADMIN — HYDROCODONE BITARTRATE AND ACETAMINOPHEN PRN TAB: 5; 325 TABLET ORAL at 12:40

## 2018-05-18 RX ADMIN — HYDROCODONE BITARTRATE AND ACETAMINOPHEN PRN TAB: 5; 325 TABLET ORAL at 08:26

## 2018-05-18 RX ADMIN — HYDROCODONE BITARTRATE AND ACETAMINOPHEN PRN TAB: 5; 325 TABLET ORAL at 03:14

## 2018-05-18 RX ADMIN — TIMOLOL MALEATE SCH DROP: 5 SOLUTION OPHTHALMIC at 09:50

## 2018-05-18 RX ADMIN — HEPARIN SODIUM SCH: 5000 INJECTION, SOLUTION INTRAVENOUS; SUBCUTANEOUS at 14:29

## 2018-05-18 RX ADMIN — HEPARIN SODIUM SCH UNIT: 5000 INJECTION, SOLUTION INTRAVENOUS; SUBCUTANEOUS at 05:24

## 2018-12-13 ENCOUNTER — HOSPITAL ENCOUNTER (INPATIENT)
Dept: HOSPITAL 80 - F3N | Age: 63
LOS: 5 days | Discharge: HOME | DRG: 353 | End: 2018-12-18
Attending: SURGERY | Admitting: SURGERY
Payer: COMMERCIAL

## 2018-12-13 DIAGNOSIS — H40.9: ICD-10-CM

## 2018-12-13 DIAGNOSIS — K43.2: Primary | ICD-10-CM

## 2018-12-13 DIAGNOSIS — Z87.19: ICD-10-CM

## 2018-12-13 DIAGNOSIS — Z09: ICD-10-CM

## 2018-12-13 DIAGNOSIS — G47.33: ICD-10-CM

## 2018-12-13 DIAGNOSIS — Q79.59: ICD-10-CM

## 2018-12-13 PROCEDURE — 0WUF0JZ SUPPLEMENT ABDOMINAL WALL WITH SYNTHETIC SUBSTITUTE, OPEN APPROACH: ICD-10-PCS | Performed by: SURGERY

## 2018-12-13 PROCEDURE — C1781 MESH (IMPLANTABLE): HCPCS

## 2018-12-13 RX ADMIN — KETOROLAC TROMETHAMINE SCH: 15 INJECTION, SOLUTION INTRAMUSCULAR; INTRAVENOUS at 22:19

## 2018-12-13 RX ADMIN — MEPERIDINE HYDROCHLORIDE PRN MG: 25 INJECTION, SOLUTION INTRAMUSCULAR; INTRAVENOUS; SUBCUTANEOUS at 18:55

## 2018-12-13 RX ADMIN — MEPERIDINE HYDROCHLORIDE PRN MG: 25 INJECTION, SOLUTION INTRAMUSCULAR; INTRAVENOUS; SUBCUTANEOUS at 19:49

## 2018-12-13 RX ADMIN — SODIUM CHLORIDE, SODIUM LACTATE, POTASSIUM CHLORIDE, AND CALCIUM CHLORIDE SCH MLS: 600; 310; 30; 20 INJECTION, SOLUTION INTRAVENOUS at 22:21

## 2018-12-13 RX ADMIN — Medication SCH MLS: at 18:55

## 2018-12-13 NOTE — PDANEPAE
ANE History of Present Illness





here for Ventral hernia repair





ANE Past Medical History





- Cardiovascular History


Hx Hypertension: No


Hx Arrhythmias: No


Hx Chest Pain: No


Hx Coronary Artery / Peripheral Vascular Disease: No


Hx CHF / Valvular Disease: No


Hx Palpitations: No





- Pulmonary History


Hx COPD: No


Hx Asthma/Reactive Airway Disease: No


Hx Recent Upper Respiratory Infection: No


Hx Oxygen in Use at Home: No


Hx Sleep Apnea: Yes


Sleep Apnea Screening Result - Last Documented: Positive


Pulmonary History Comment: NELIDA uses CPAP





- Neurologic History


Hx Cerebrovascular Accident: No


Hx Seizures: No


Hx Dementia: No





- Endocrine History


Hx Diabetes: No





- Renal History


Hx Renal Disorders: No





- Liver History


Hx Hepatic Disorders: No





- Neurological & Psychiatric Hx


Hx Neurological and Psychiatric Disorders: No


Neurological / Psychiatric History Comment: hx depression





- Cancer History


Hx Cancer: No





- Congenital Disorder History


Hx Congenital Disorders: No





- GI History


Hx Gastrointestinal Disorders: No





- Other Health History


Other Health History: glaucoma





- Surgical History


Prior Surgeries: explor lap 5/18





ANE Review of Systems


Review of systems is: negative


Review of Systems: 








- Exercise capacity


Exercise capacity: >=4 METS


METS (RN): 4 METS





ANE Patient History





- Allergies


Allergies/Adverse Reactions: 








codeine Allergy (Verified 12/13/18 14:28)


 Hallucinations








- Home Medications


Home medications: home medication list seen and reviewed


Home Medications: 








Timolol 0.5% [TIMOPTIC 0.5% (*)] 1 drops EACHEYE DAILY 05/11/18 [Last Taken 12/ 12/18]


C/E/Zn/Cu/OM3/DHA/EPA/LUT/ZEAX [Preservision Areds 2 Softgel] 1 each PO DAILY 12 /04/18 [Last Taken 12/11/18]








- NPO status


NPO Status: no food or drink >8 hours


NPO Since - Liquids (Date): 12/13/18


NPO Since - Liquids (Time): 12:00


NPO Since - Solids (Date): 12/12/18


NPO Since - Solids (Time): 21:00





- Anes Hx


Anes Hx: no prior problems





- Smoking Hx


Smoking Status: Never smoked





- Family Anes Hx


Family Hx Anesthesia Complications: none





ANE Labs/Vital Signs





- Vital Signs


Blood Pressure: 157/94


Heart Rate: 73


Respiratory Rate: 18


O2 Sat (%): 96


Height: 170.18 cm


Weight: 90.718 kg





ANE Physical Exam





- Airway


Neck exam: FROM


[facility]





- Pulmonary


Pulmonary: no respiratory distress





- Cardiovascular


Cardiovascular: regular rate and rhythym





- ASA Status


ASA Status: II





ANE Anesthesia Plan


Anesthesia Plan: general endotracheal anesthesia, epidural

## 2018-12-13 NOTE — POSTOPPROG
Post Op Note


Date of Operation: 12/13/18


Surgeon: Juan Toure


Assistant: shabana pack


Pre-op Diagnosis: incidisonaol hernia


Post-op Diagnosis: same


Indication: same


Procedure: incisinal henria repair- rive stopjoselin, with bilateral anterior 

component sep


Findings: distais rectii, large inciisonal hernia


Inf/Abcess present in the surg proc area at time of surgery?: No

## 2018-12-13 NOTE — PDHPUP
History & Physical Update


H&P update statement: 


This history and physical update is based on an assessment of the patient which 

was completed after admission or registration (within 24 hours), but prior to 

the surgery/procedure.


[email or web address]


H&P update: H&P reviewed & patient examined, no change in patient's condition 

since H&P completed


H&P changes: none

## 2018-12-14 RX ADMIN — Medication SCH MLS: at 05:53

## 2018-12-14 RX ADMIN — SODIUM CHLORIDE, SODIUM LACTATE, POTASSIUM CHLORIDE, AND CALCIUM CHLORIDE SCH MLS: 600; 310; 30; 20 INJECTION, SOLUTION INTRAVENOUS at 06:16

## 2018-12-14 RX ADMIN — KETOROLAC TROMETHAMINE SCH MG: 15 INJECTION, SOLUTION INTRAMUSCULAR; INTRAVENOUS at 05:19

## 2018-12-14 RX ADMIN — KETOROLAC TROMETHAMINE SCH MG: 15 INJECTION, SOLUTION INTRAMUSCULAR; INTRAVENOUS at 00:17

## 2018-12-14 RX ADMIN — Medication SCH MLS: at 18:35

## 2018-12-14 RX ADMIN — Medication SCH EA: at 10:11

## 2018-12-14 NOTE — GOP
DATE OF OPERATION:  12/13/2018



SURGEON:  Juan Toure MD



ASSISTANT:  JOHN Leary, LALOA.



PREOPERATIVE DIAGNOSIS:  Large incisional hernia.



POSTOPERATIVE DIAGNOSIS:  Large incisional hernia.



PROCEDURE PERFORMED:  Incisional hernia repair with retrorectus mesh placement, bilateral anterior co
mponent separation.



FINDINGS:  





INDICATIONS:  The patient underwent a right colectomy, for what proved to be an abscess of the right 
colon 6 months ago, now presents with a large defect to the right of midline.



DESCRIPTION OF PROCEDURE:  General anesthetic.  The abdomen scrubbed with ChloraPrep, draped in usual
 sterile fashion.  The midline incision was opened eventually to its full length, and the hernia sac 
defined and largely excised.  There was quite a broad defect there, and upon identifying the edge of 
the rectus sheath, it was found that the patient had quite a wide diastasis recti further complicatin
g the potential to bring the midline together.  Eventually attenuated tissue was removed such that th
e medial edge of the rectus sheath was defined on both sides and then opened allowing dissection in a
 retrorectus plane leaving the muscle anteriorly and creating a posterior flap all the way back to th
e semilunar line.  This was done on either side, and while this did advance the midline it was still 
insufficient to allow closure.  Therefore, an anterior component separation was done initially on the
 right by undermining the subcutaneous fat until the edge of the rectus sheath was identified.  1 cm 
lateral to this an incision was made in the external oblique, carried superiorly all the way up over 
the costal margin and inferiorly almost to the pubis.  This gave quite an advancement, but it was sti
ll felt it was under tension, so a similar anterior component separation was done on the left side as
 well.  With this done, the posterior rectus sheath was closed to cover the viscera completely and se
parated from the mesh.  The distance between rectus sheaths from one semilunar line to the other was 
measured with a ruler, and then an appropriate piece of polypropylene mesh covered the area behind th
e rectus muscles on both sides.  After this mesh was placed, no fixation sutures were placed, but the
 anterior rectus sheath was closed over this with running 0 Prolene.  With the retrorectus mesh place
ment completed, inspection for hemostasis was done, the wound was irrigated with saline, and 4 Jackso
n-Og drains were placed 2 on each side.  To prevent large seroma collections from forming despite 
the drains, numerous pleating sutures of 2-0 Vicryl were used both to isolate each drain into its own
 channel and to bring the subcutaneous fat down toward the midline closure. After this tedious placem
ent of multiple quilting sutures, the skin was closed with stainless steel clips, drain sutured in pl
ace.  The patient had an epidural anesthetic, tolerated the procedure.





Job #:  [id number]

## 2018-12-14 NOTE — SOAPPROG
SOAP Progress Note


Assessment/Plan: 


Assessment:


























Plan:





Subjective: 





pod 1


complicated henria repair.





vss, af


drains already clearing up to serous.


binder in place.





assess: florentino post op course complicated ventral henria reapir. epidural 

working. cont presen tcare, start on clear liquid diet, leave ahumada in while 

epidural in.    


Objective: 





 Vital Signs











Temp Pulse Resp BP Pulse Ox


 


 37.2 C   81   16   112/59 L  89 L


 


 12/14/18 07:47  12/14/18 07:47  12/14/18 07:47  12/14/18 07:47  12/14/18 07:47








 











 12/13/18 12/14/18 12/15/18





 05:59 05:59 05:59


 


Intake Total  1970 


 


Output Total  495 


 


Balance  1475 














ICD10 Worksheet


Patient Problems: 


 Problems











Problem Status Onset


 


Acute appendicitis Acute

## 2018-12-14 NOTE — PDPAINCON
Pain Management Consultation





- Subjective


Pain at rest (/10): 2


Pain with activity (/10): 3


Pain is: under control


Activity: out of bed with assistance





- Objective


Technique: continuous epidural


Continuous infusion: bupivicaine


Catheter site: clean, dry, intact, no erythema/edema/exudate


Sensory and motor exam: consistent with block (Pt doing well.  Great pain 

control with epidural.  Levels at T6-T11.  Continue with current management)


Vital signs: stable





- Assessment/Plan


Assessment/Plan: pain well-controlled, continue current mgmt

## 2018-12-14 NOTE — ASMTCMCOM
CM Note

 

CM Note                       

Notes:

Pt is a 64 y/o female admitted for a ventral hernia. Pt went to surgery w/ Dr. Toure. Pt should 


not have any d/c needs. No therapies ordered at this time. CM available for changes.







Plan: Independent 

 

Date Signed:  12/14/2018 10:43 AM

Electronically Signed By:SHRADDHA Houston

## 2018-12-14 NOTE — PDMN
Medical Necessity


Medical necessity: [facility]   hernia repair- INPT for very large hernia req  

inpt  monitoring  OP:  open ventral hernia repair -large : incisional hernia 

repair, rive stoppa, with bilateral anterior component separation

## 2018-12-15 RX ADMIN — Medication SCH EA: at 09:31

## 2018-12-15 RX ADMIN — Medication SCH MLS: at 05:57

## 2018-12-15 RX ADMIN — HEPARIN SODIUM SCH UNIT: 5000 INJECTION, SOLUTION INTRAVENOUS; SUBCUTANEOUS at 06:25

## 2018-12-15 RX ADMIN — Medication SCH EACH: at 09:29

## 2018-12-15 RX ADMIN — Medication SCH MLS: at 13:20

## 2018-12-15 RX ADMIN — Medication SCH EA: at 09:30

## 2018-12-15 RX ADMIN — HEPARIN SODIUM SCH UNIT: 5000 INJECTION, SOLUTION INTRAVENOUS; SUBCUTANEOUS at 21:44

## 2018-12-15 RX ADMIN — TIMOLOL MALEATE SCH DROPS: 5 SOLUTION OPHTHALMIC at 09:30

## 2018-12-15 RX ADMIN — HEPARIN SODIUM SCH UNIT: 5000 INJECTION, SOLUTION INTRAVENOUS; SUBCUTANEOUS at 13:14

## 2018-12-15 NOTE — SOAPPROG
SOAP Progress Note


Assessment/Plan: 


Assessment:

















slowly advance diet. cont epidural untilat least tomorrow.








Plan:





12/15/18 07:06





Subjective: 





little pain, passsing flatus.


Objective: 





 Vital Signs











Temp Pulse Resp BP Pulse Ox


 


 37.1 C   86   17   135/72 H  91 L


 


 12/15/18 04:00  12/15/18 04:00  12/15/18 04:00  12/15/18 04:00  12/15/18 04:00








 











 12/14/18 12/15/18 12/16/18





 05:59 05:59 05:59


 


Intake Total 1970 1848 


 


Output Total 495 655 


 


Balance 1475 1193 








abd soft, velcro binder in place, drains serous.





ICD10 Worksheet


Patient Problems: 


 Problems











Problem Status Onset


 


Acute appendicitis Acute

## 2018-12-15 NOTE — SOAPPROG
SOAP Progress Note


Assessment/Plan: 


Assessment:





S/P Ventral Hernia Repair with Thoracic epidural. Doing well with minimal pain.




















Plan:





12/15/18 18:11





Subjective: 


Reports 3/10 pain. Only hitting PCEA button occasionally. No Side effects of 

nausea or itching.





Objective: 





 Vital Signs











Temp Pulse Resp BP Pulse Ox


 


 36.9 C   82   14   116/69   91 L


 


 12/15/18 15:27  12/15/18 15:27  12/15/18 15:27  12/15/18 15:27  12/15/18 15:27








 











 12/14/18 12/15/18 12/16/18





 05:59 05:59 05:59


 


Intake Total 1970 1848 


 


Output Total 329 653 650


 


Balance 1475 1193 -650








Epidural site clean dry dressing intact with no pain on palpation over site





- Time Spent With Patient


Time Spent With Patient: 





15 minutes





ICD10 Worksheet


Patient Problems: 


 Problems











Problem Status Onset


 


Acute appendicitis Acute

## 2018-12-16 RX ADMIN — Medication SCH EACH: at 09:02

## 2018-12-16 RX ADMIN — Medication SCH MLS: at 11:37

## 2018-12-16 RX ADMIN — Medication SCH EA: at 09:03

## 2018-12-16 RX ADMIN — Medication SCH MLS: at 21:26

## 2018-12-16 RX ADMIN — TIMOLOL MALEATE SCH DROPS: 5 SOLUTION OPHTHALMIC at 09:02

## 2018-12-16 RX ADMIN — Medication SCH MLS: at 01:01

## 2018-12-16 RX ADMIN — HEPARIN SODIUM SCH UNIT: 5000 INJECTION, SOLUTION INTRAVENOUS; SUBCUTANEOUS at 06:24

## 2018-12-16 NOTE — SOAPPROG
SOAP Progress Note


Assessment/Plan: 


Assessment:





well, appearing, site CDI, VSS on O2, pain under control, no side effects




















Plan:


will start po pain meds prn, continue epidural until tomorrow, heparin on hold 

for epidural removal tomorrow





12/16/18 12:17





Subjective: 





post op day 3 ventral hernia repair


Objective: 





 Vital Signs











Temp Pulse Resp BP Pulse Ox


 


 37.0 C   76   16   127/74 H  96 


 


 12/16/18 08:00  12/16/18 08:00  12/16/18 08:00  12/16/18 08:00  12/16/18 08:00








 











 12/15/18 12/16/18 12/17/18





 05:59 05:59 05:59


 


Intake Total 1848  200


 


Output Total 659 760 50


 


Balance 1193 -216 150














- Time Spent With Patient


Time Spent With Patient: 





10 min





- Pending Discharge


Pending Discharge Within 48 Hours: Yes


Pending Discharge Date: 12/18/18


Pending Discharge Time: 11:00





ICD10 Worksheet


Patient Problems: 


 Problems











Problem Status Onset


 


Acute appendicitis Acute

## 2018-12-16 NOTE — SOAPPROG
SOAP Progress Note


Assessment/Plan: 


Assessment/Plan:





63-year-old woman post up from ventral hernia repair with component separation 

myocutaneous advancement flap.  Thoracic epidural for pain control.  Had a 

small bowel movement last night.  Tolerating a diet.  Pain well controlled.  

Encourage ambulation.  Out of bed.  Incision clean dry and intact KAM 

serosanguineous.  Likely removed epidural today or tomorrow go convert her to 

oral medications at that time.





12/16/18 08:37





Objective: 





 Vital Signs











Temp Pulse Resp BP Pulse Ox


 


 37.0 C   76   16   127/74 H  96 


 


 12/16/18 08:00  12/16/18 08:00  12/16/18 08:00  12/16/18 08:00  12/16/18 08:00








 











 12/15/18 12/16/18 12/17/18





 05:59 05:59 05:59


 


Intake Total 1848  200


 


Output Total 197 760 50


 


Balance 1193 -760 150














ICD10 Worksheet


Patient Problems: 


 Problems











Problem Status Onset


 


Acute appendicitis Acute

## 2018-12-17 RX ADMIN — OXYCODONE HYDROCHLORIDE PRN MG: 15 TABLET ORAL at 23:22

## 2018-12-17 RX ADMIN — Medication SCH MLS: at 08:28

## 2018-12-17 RX ADMIN — OXYCODONE HYDROCHLORIDE AND ACETAMINOPHEN PRN TAB: 5; 325 TABLET ORAL at 19:19

## 2018-12-17 RX ADMIN — Medication SCH EA: at 08:02

## 2018-12-17 RX ADMIN — Medication SCH EACH: at 08:06

## 2018-12-17 RX ADMIN — TIMOLOL MALEATE SCH DROPS: 5 SOLUTION OPHTHALMIC at 08:03

## 2018-12-17 NOTE — ASMTCMCOM
CM Note

 

CM Note                       

Notes:

CM discussed with Ernestina BOLTON, likely will discharge independent with no needs. CM to follow. 



Plan: Independent. 

 

Date Signed:  12/16/2018 03:34 PM

Electronically Signed By:Pilar Holm

## 2018-12-17 NOTE — SOAPPROG
SOAP Progress Note


Assessment/Plan: 


Assessment:  doing well after componenet separation hernia repair. drains to 

stay in another several days. epidural out todeay. home n 1-2 days











 





Plan: dc epidural today., dc ahumada





12/15/18 07:06





12/17/18 07:45





Subjective: 





little pain


Objective: 





 Vital Signs











Temp Pulse Resp BP Pulse Ox


 


 37.2 C   68   16   122/73 H  99 


 


 12/17/18 03:28  12/17/18 03:28  12/17/18 03:28  12/17/18 03:28  12/17/18 03:28








 











 12/16/18 12/17/18 12/18/18





 05:59 05:59 05:59


 


Intake Total  500 


 


Output Total 760 1480 35


 


Balance -760 -980 -35








[place] line clean, noerthema. drains putting out suprisingly little serous 

fluid.





ICD10 Worksheet


Patient Problems: 


 Problems











Problem Status Onset


 


Acute appendicitis Acute

## 2018-12-17 NOTE — PDPAINCON
Pain Management Consultation


Patient referred by : Mesfin





- Subjective


Pain at rest (/10): 1


Pain with activity (/10): 4


Pain is: low, well controlled


Activity: out of bed with assistance





- Objective


Technique: continuous epidural


Catheter site: clean, dry, intact, no erythema/edema/exudate


Sensory and motor exam: consistent with block


Vital signs: stable





- Assessment/Plan


Assessment/Plan: other (epidural catheter removed easily with intact tip. 

Patient to continue pain management with PO opioids as per Dr. Toure.)

## 2018-12-18 VITALS — SYSTOLIC BLOOD PRESSURE: 154 MMHG | DIASTOLIC BLOOD PRESSURE: 91 MMHG

## 2018-12-18 RX ADMIN — HEPARIN SODIUM SCH: 5000 INJECTION, SOLUTION INTRAVENOUS; SUBCUTANEOUS at 16:48

## 2018-12-18 RX ADMIN — OXYCODONE HYDROCHLORIDE PRN MG: 15 TABLET ORAL at 12:30

## 2018-12-18 RX ADMIN — OXYCODONE HYDROCHLORIDE AND ACETAMINOPHEN PRN TAB: 5; 325 TABLET ORAL at 03:33

## 2018-12-18 RX ADMIN — TIMOLOL MALEATE SCH DROPS: 5 SOLUTION OPHTHALMIC at 08:20

## 2018-12-18 RX ADMIN — OXYCODONE HYDROCHLORIDE PRN MG: 15 TABLET ORAL at 08:19

## 2018-12-18 RX ADMIN — Medication SCH EACH: at 08:19

## 2018-12-18 RX ADMIN — OXYCODONE HYDROCHLORIDE PRN MG: 15 TABLET ORAL at 16:45

## 2018-12-18 RX ADMIN — HEPARIN SODIUM SCH UNIT: 5000 INJECTION, SOLUTION INTRAVENOUS; SUBCUTANEOUS at 05:30

## 2018-12-18 RX ADMIN — Medication SCH: at 07:18

## 2018-12-18 NOTE — GDS
The patient was admitted with a complex right-sided abdominal wall hernia as well as significant vernon
tasis recti.



HOSPITAL COURSE:  Patient underwent incisional hernia repair with a retrorectus mass placement as wel
l as bilateral anterior component separations.  At the time of discharge, her drainage has decreased 
markedly.  She is ambulating.  Epidural was removed yesterday.  She is tolerating Norco only.



FINAL DIAGNOSIS:  Incisional hernia.



OPERATION:  As above.



DISPOSITION:  Home.



FOLLOW UP:  With Dr. Toure in a week.





Job #:  [id number]

## 2019-02-20 ENCOUNTER — HOSPITAL ENCOUNTER (OUTPATIENT)
Dept: HOSPITAL 80 - FIMAGING | Age: 64
End: 2019-02-20
Attending: GENERAL ACUTE CARE HOSPITAL
Payer: COMMERCIAL

## 2019-02-20 DIAGNOSIS — Z12.31: Primary | ICD-10-CM
